# Patient Record
Sex: MALE | URBAN - METROPOLITAN AREA
[De-identification: names, ages, dates, MRNs, and addresses within clinical notes are randomized per-mention and may not be internally consistent; named-entity substitution may affect disease eponyms.]

---

## 2019-04-02 ENCOUNTER — OFFICE VISIT (OUTPATIENT)
Dept: URBAN - METROPOLITAN AREA CLINIC 62 | Facility: CLINIC | Age: 29
End: 2019-04-02

## 2019-04-02 DIAGNOSIS — H52.13 MYOPIA, BILATERAL: Primary | ICD-10-CM

## 2019-04-02 ASSESSMENT — INTRAOCULAR PRESSURE
OS: 10
OD: 12

## 2019-04-02 ASSESSMENT — VISUAL ACUITY
OS: 20/20
OD: 20/20

## 2019-04-02 NOTE — IMPRESSION/PLAN
Impression: Myopia, bilateral: H52.13. Plan: Topography symmetrical with no indications of Keratoconus.  Refer to Dr. Rodger Dan for [facility].

## 2019-04-19 ENCOUNTER — OFFICE VISIT (OUTPATIENT)
Dept: URBAN - METROPOLITAN AREA CLINIC 62 | Facility: CLINIC | Age: 29
End: 2019-04-19

## 2019-04-19 ASSESSMENT — INTRAOCULAR PRESSURE
OD: 12
OS: 11

## 2019-04-19 ASSESSMENT — VISUAL ACUITY
OD: 20/20
OS: 20/20